# Patient Record
Sex: FEMALE | Race: WHITE | NOT HISPANIC OR LATINO | Employment: UNEMPLOYED | URBAN - METROPOLITAN AREA
[De-identification: names, ages, dates, MRNs, and addresses within clinical notes are randomized per-mention and may not be internally consistent; named-entity substitution may affect disease eponyms.]

---

## 2024-03-27 ENCOUNTER — OFFICE VISIT (OUTPATIENT)
Dept: GYNECOLOGIC ONCOLOGY | Facility: CLINIC | Age: 79
End: 2024-03-27
Payer: MEDICARE

## 2024-03-27 ENCOUNTER — PREP FOR SURGERY (OUTPATIENT)
Dept: OTHER | Facility: HOSPITAL | Age: 79
End: 2024-03-27
Payer: MEDICARE

## 2024-03-27 VITALS
OXYGEN SATURATION: 99 % | BODY MASS INDEX: 33.77 KG/M2 | DIASTOLIC BLOOD PRESSURE: 66 MMHG | WEIGHT: 190.6 LBS | TEMPERATURE: 98.7 F | HEIGHT: 63 IN | SYSTOLIC BLOOD PRESSURE: 143 MMHG | RESPIRATION RATE: 18 BRPM | HEART RATE: 84 BPM

## 2024-03-27 DIAGNOSIS — C54.1 ENDOMETRIAL CANCER: Primary | ICD-10-CM

## 2024-03-27 DIAGNOSIS — N87.9 CERVICAL DYSPLASIA: Primary | ICD-10-CM

## 2024-03-27 PROCEDURE — 1126F AMNT PAIN NOTED NONE PRSNT: CPT | Performed by: OBSTETRICS & GYNECOLOGY

## 2024-03-27 PROCEDURE — 99204 OFFICE O/P NEW MOD 45 MIN: CPT | Performed by: OBSTETRICS & GYNECOLOGY

## 2024-03-27 NOTE — PROGRESS NOTES
"Fallon Pierson  [id number]  1945      Reason for visit:  Newly diagnosed endometrial cancer     Consultation:  Patient is being seen at the request of Dr. Gilman     History of present illness:  The patient is a 78 y.o. year old female who presents today for treatment and evaluation of the above issues.    77 yo, , female present today after new diagnosis of grade 3 EAC. Patient states that PMB  occurs randomly; started approximately 6 months ago. Patient denies any other symptoms including abdominal pain, changes to appetite/weight, N/V, chest pain/SOB, changes to bowel/bladder habits. Patient reports that she has only had 1 Pap smear in her life and that was \"a long time ago\".     TVUS done by Dr. Gilman demonstrated thickened endometrial stripe to 23 mm with vascularity with prominent cervix measuring 6.4 x 5.6 x 5.6 cm . She underwent D&C and pathology demonstrated grade 3 EAC.     She has done well since D&C. She feels well today, continues to have daily vaginal bleeding, but not enough to soak a pad. Of note, she has never had a colonoscopy, it has been >10 years since last mammogram and >10 years since last pap smear.     For new patients, [facility] intake form from  was reviewed and confirmed.    OBGYN History:  She is a .  She does not use HRT. She does have a history of abnormal pap smears, unsure of abnormality.       Oncologic History:  Oncology/Hematology History    No history exists.         History reviewed. No pertinent past medical history.    Past Surgical History:   Procedure Laterality Date    DILATATION AND CURETTAGE         MEDICATIONS:  No current outpatient medications on file.     Allergies:  has No Known Allergies.    Social History:   Social History     Socioeconomic History    Marital status:    Tobacco Use    Smoking status: Never    Smokeless tobacco: Never   Substance and Sexual Activity    Alcohol use: Never    Drug use: Never    Sexual activity: Not Currently " "      Family History:    Family History   Problem Relation Age of Onset    No Known Problems Father     No Known Problems Mother     Breast cancer Sister     No Known Problems Son        Health Maintenance:    Health Maintenance   Topic Date Due    DXA SCAN  Never done    BMI FOLLOWUP  Never done    ZOSTER VACCINE (1 of 2) Never done    RSV Vaccine - Adults (1 - 1-dose 60+ series) Never done    TDAP/TD VACCINES (2 - Tdap) 03/20/2010    Pneumococcal Vaccine 65+ (1 of 1 - PCV) Never done    COVID-19 Vaccine (1 - 2023-24 season) Never done    HEPATITIS C SCREENING  Never done    ANNUAL WELLNESS VISIT  Never done    INFLUENZA VACCINE  08/01/2024         Review of Systems:   All other systems were reviewed and are negative except as mentioned above.    Physical Exam    Vitals:    03/27/24 1300   BP: 143/66   Pulse: 84   Resp: 18   Temp: 98.7 °F (37.1 °C)   TempSrc: Temporal   SpO2: 99%   Weight: 86.5 kg (190 lb 9.6 oz)   Height: 160 cm (63\")   PainSc: 0-No pain       Body mass index is 33.76 kg/m².    Wt Readings from Last 3 Encounters:   03/27/24 86.5 kg (190 lb 9.6 oz)       GENERAL: Alert, well-appearing female appearing her stated age who is in no apparent distress.   HEENT: Sclera anicteric. Head normocephalic, atraumatic. Mucus membranes moist.   NECK: Trachea midline, supple, without masses.  No thyromegaly.   BREASTS: Deferred  CARDIOVASCULAR: Normal rate, regular rhythm, no murmurs, rubs, or gallops.  No peripheral edema.  RESPIRATORY: Clear to auscultation bilaterally, normal respiratory effort  BACK:  No CVA tenderness, no vertebral tenderness on palpation  GASTROINTESTINAL:  Abdomen is soft, non-tender, non-distended, no rebound or guarding, no masses, or hernias. No HSM.    SKIN:  Warm, dry, well-perfused.  All visible areas intact.  No rashes, lesions, ulcers.  PSYCHIATRIC: AO x3, with appropriate affect, normal thought processes.  NEUROLOGIC: No focal deficits.  Moves extremities well.  MUSCULOSKELETAL: " "Normal gait and station.   EXTREMITIES:   No cyanosis, clubbing, symmetric.  LYMPHATICS:  No cervical or inguinal adenopathy noted.     PELVIC exam:    External genitalia are free from lesion. On speculum examination, the cervix appears to be dilated with visible, friable tumor within cervical os. Ectocervix appears normal without lesion. On bimanual examination cervix is enlarged, lower uterine segment is ballooned out.  Uterus enlarged. There is no cervical motion or uterine tenderness. No cervical mass was palpated. Difficult to determine if parametria were smooth due to enlarged cervix/uterus. Rectovaginal exam was deferred.     ECOG PS 0    PROCEDURES:      Diagnostic Data:      XR Chest 2 View    Result Date: 3/18/2024   No acute cardiopulmonary abnormality.  Created by: Kashif Amado MD  Signed by: Kashif Amado MD  Signed on: 3/18/2024 5:31 PM  Location: [id number]            CT Abdomen Pelvis With Contrast    Result Date: 3/7/2024       1.   Malignant cervical and endometrial mass. Primary could be primary cervical neoplasm extending into the endometrium or primary endometrial neoplasm extending into the cervix.      2.   No evidence of abdominal or pelvic metastasis.  3. Cholelithiasis.       Created by: Arturo Davenport MD  Signed by: Arturo Davenport MD  Signed on: 3/7/2024 4:59 PM  Location: [id number]            US Pelvis Complete    Result Date: 3/7/2024       1.   Abnormally thickened endometrial stripe with vascularity. Malignancy is not excluded. Biopsy is recommended.      2.   The uterine cervix also appears prominent. Malignancy cannot be excluded.  3. 3.2 cm uterine fibroid.       Created by: Kaelyn Corona MD  Signed by: Kaelyn Corona MD  Signed on: 3/7/2024 4:52 PM  Location: [id number]              No results found for: \"WBC\", \"HGB\", \"HCT\", \"MCV\", \"PLT\", \"NEUTROABS\", \"GLUCOSE\", \"BUN\", \"CREATININE\", \"EGFRIFNONA\", \"EGFRIFAFRI\", \"NA\", \"K\", \"CL\", \"CO2\", \"MG\", \"PHOS\", \"CALCIUM\", \"ALBUMIN\", \"AST\", \"ALT\", " "\"BILITOT\"  No results found for: \"\"        Assessment & Plan   This is a 78 y.o. woman with newly diagnosed grade xxx EAC.     Grade 3 endometrioid adenocarcinoma  -PMB x 6 months, limited medical care  -s/p ECC with the above pathology   -CT scan report from march 2024 reviewed Primary could be primary cervical neoplasm extending into the endometrium or primary endometrial neoplasm extending into the cervix.      2.   No evidence of abdominal or pelvic metastasis. Will have to get film loaded.  -On exam, the cervix appears to be dilated with visible, friable tumor within cervical os. Ectocervix appears normal without lesion. On bimanual examination cervix is enlarged, lower uterine segment is ballooned out.  Uterus enlarged.   -Dr. Salgado unable to personally view images, but given findings on exam, unable to discern if cervical cancer component is also occurring in addition to endometrial cancer   -Pap smear HPV only collected today to help differentiate between atypical histology cervical primary and stage II endometrial cancer  -Will plan for follow up following pap results to discuss next steps including possible surgical intervention       Encounter Diagnosis   Name Primary?    Cervical dysplasia Yes       Pain assessment was performed today as a part of patient’s care.  For patients with pain related to surgery, gynecologic malignancy or cancer treatment, the plan is as noted in the assessment/plan.  For patients with pain not related to these issues, they are to seek any further needed care from a more appropriate provider, such as PCP.      No orders of the defined types were placed in this encounter.      FOLLOW UP: Follow up following pap results in person vs over video visit to discuss next steps     Yadira Onofre MD   PGY3- OBGYN Resident   [facility]    I spent 45 minutes caring for Penn Laird on this date of service. This time includes time spent by me in the following activities: " preparing for the visit, reviewing tests, performing a medically appropriate examination and/or evaluation, counseling and educating the patient/family/caregiver, ordering medications, tests, or procedures, referring and communicating with other health care professionals, documenting information in the medical record, and care coordination      Patient was seen and examined with Dr. Maciel,  resident, who performed portions of the examination and documentation for this patient's care under my direct supervision.  I agree with the above documentation and plan.    Tanya Salgado MD  04/03/24  09:29 EDT

## 2024-03-29 LAB — REF LAB TEST METHOD: NORMAL

## 2024-04-04 ENCOUNTER — TELEPHONE (OUTPATIENT)
Dept: GYNECOLOGIC ONCOLOGY | Facility: CLINIC | Age: 79
End: 2024-04-04
Payer: MEDICARE

## 2024-04-04 NOTE — TELEPHONE ENCOUNTER
RN called patient and discussed that there was not a biopsy but a pap was done. Patient stated that she saw the results on the pap were negative. RN confirmed that this was correct per her chart but encouraged patient to keep the f/u visit with Dr. Salgado that is scheduled for next Wednesday, 4/10. Patient verified time and date and verbalized intention to come to f/u.

## 2024-04-10 ENCOUNTER — TELEMEDICINE (OUTPATIENT)
Dept: GYNECOLOGIC ONCOLOGY | Facility: CLINIC | Age: 79
End: 2024-04-10
Payer: MEDICARE

## 2024-04-10 DIAGNOSIS — C54.1 ENDOMETRIAL CANCER: Primary | ICD-10-CM

## 2024-04-10 PROCEDURE — 1126F AMNT PAIN NOTED NONE PRSNT: CPT | Performed by: OBSTETRICS & GYNECOLOGY

## 2024-04-10 PROCEDURE — 1159F MED LIST DOCD IN RCRD: CPT | Performed by: OBSTETRICS & GYNECOLOGY

## 2024-04-10 PROCEDURE — 99212 OFFICE O/P EST SF 10 MIN: CPT | Performed by: OBSTETRICS & GYNECOLOGY

## 2024-04-10 PROCEDURE — 1160F RVW MEDS BY RX/DR IN RCRD: CPT | Performed by: OBSTETRICS & GYNECOLOGY

## 2024-04-10 NOTE — PROGRESS NOTES
Sorry for miscommunication - will consider RT as a part of patients care after neoadjuvant treatment followed by surgery depending on response to treatment.

## 2024-04-10 NOTE — LETTER
"Fallon Pierson  [id number]  1945      Reason for visit:  Grade 3 endometrial cancer     Consultation:  Patient initially seen at the request of Dr. Gilman     History of present illness:  The patient is a 78 y.o. year old female who presents today for treatment and evaluation of the above issues.    From previous visit 3/27/2024   \"79 yo, , female present today after new diagnosis of grade 3 EAC. Patient states that PMB  occurs randomly; started approximately 6 months ago. Patient denies any other symptoms including abdominal pain, changes to appetite/weight, N/V, chest pain/SOB, changes to bowel/bladder habits. Patient reports that she has only had 1 Pap smear in her life and that was \"a long time ago\".   TVUS done by Dr. Gilman demonstrated thickened endometrial stripe to 23 mm with vascularity with prominent cervix measuring 6.4 x 5.6 x 5.6 cm . She underwent D&C and pathology demonstrated grade 3 EAC on ECC specimen.  She has done well since D&C. She feels well today, continues to have daily vaginal bleeding, but not enough to soak a pad. Of note, she has never had a colonoscopy, it has been >10 years since last mammogram and >10 years since last pap smear.\"  Since patient's initial visit, have been able to visualize the CT imaging which shows a suspicious periotic lymph node measuring just shy of 10 mm.  I pointed this out to the patient and her family while on video visit.  I pointed out the medically enlarged cervix we discussed that challenges from a surgical standpoint.    OBGYN History:  She is a .  She does not use HRT. She does have a history of abnormal pap smears, unsure of abnormality.     Oncologic History:  Oncology/Hematology History   Endometrial cancer   2024 Initial Diagnosis    Endometrial cancer     4/10/2024 Cancer Staged    Staging form: Corpus Uteri - Carcinoma And Carcinosarcoma, AJCC 8th Edition  - Clinical stage from 4/10/2024: FIGO Stage IIIC2 (cT2, cN2, cM0) - " Signed by Tanya Salgado MD on 4/10/2024     4/24/2024 -  Chemotherapy    OP ENDOMETRIAL Dostarlimab-gxly / CARBOplatin AUC=5 / PACLitaxel           History reviewed. No pertinent past medical history.    Past Surgical History:   Procedure Laterality Date    DILATATION AND CURETTAGE         MEDICATIONS:  No current outpatient medications on file.     Allergies:  has No Known Allergies.    Social History:   Social History     Socioeconomic History    Marital status:    Tobacco Use    Smoking status: Never    Smokeless tobacco: Never   Substance and Sexual Activity    Alcohol use: Never    Drug use: Never    Sexual activity: Not Currently       Family History:    Family History   Problem Relation Age of Onset    No Known Problems Father     No Known Problems Mother     Breast cancer Sister     No Known Problems Son        Health Maintenance:    Health Maintenance   Topic Date Due    DXA SCAN  Never done    BMI FOLLOWUP  Never done    ZOSTER VACCINE (1 of 2) Never done    RSV Vaccine - Adults (1 - 1-dose 60+ series) Never done    TDAP/TD VACCINES (2 - Tdap) 03/20/2010    Pneumococcal Vaccine 65+ (1 of 1 - PCV) Never done    COVID-19 Vaccine (1 - 2023-24 season) Never done    HEPATITIS C SCREENING  Never done    ANNUAL WELLNESS VISIT  Never done    INFLUENZA VACCINE  08/01/2024         Review of Systems:   All other systems were reviewed and are negative except as mentioned above.    Physical Exam    There were no vitals filed for this visit.      There is no height or weight on file to calculate BMI.    Wt Readings from Last 3 Encounters:   03/27/24 86.5 kg (190 lb 9.6 oz)       GENERAL: Alert, well-appearing female appearing her stated age who is in no apparent distress.   HEENT: Sclera anicteric. Head normocephalic, atraumatic. Mucus membranes moist.   NECK: Trachea midline, supple, without masses.  No thyromegaly.   BREASTS: Deferred  Remainder of examination deferred due to video visit    Diagnostic  "Data:      XR Chest 2 View    Result Date: 3/18/2024   No acute cardiopulmonary abnormality.  Created by: Kashif Amado MD  Signed by: Kashif Amado MD  Signed on: 3/18/2024 5:31 PM  Location: [id number]            CT Abdomen Pelvis With Contrast    Result Date: 3/7/2024       1.   Malignant cervical and endometrial mass. Primary could be primary cervical neoplasm extending into the endometrium or primary endometrial neoplasm extending into the cervix.      2.   No evidence of abdominal or pelvic metastasis.  3. Cholelithiasis.       Created by: Arturo Davenport MD  Signed by: Arturo Davenport MD  Signed on: 3/7/2024 4:59 PM  Location: [id number]            US Pelvis Complete    Result Date: 3/7/2024       1.   Abnormally thickened endometrial stripe with vascularity. Malignancy is not excluded. Biopsy is recommended.      2.   The uterine cervix also appears prominent. Malignancy cannot be excluded.  3. 3.2 cm uterine fibroid.       Created by: Kaelyn Corona MD  Signed by: Kaelyn Corona MD  Signed on: 3/7/2024 4:52 PM  Location: [id number]              No results found for: \"WBC\", \"HGB\", \"HCT\", \"MCV\", \"PLT\", \"NEUTROABS\", \"GLUCOSE\", \"BUN\", \"CREATININE\", \"EGFRIFNONA\", \"EGFRIFAFRI\", \"NA\", \"K\", \"CL\", \"CO2\", \"MG\", \"PHOS\", \"CALCIUM\", \"ALBUMIN\", \"AST\", \"ALT\", \"BILITOT\"  No results found for: \"\"        Assessment & Plan   This is a 78 y.o. woman with newly diagnosed grade endometrioid adenocarcinoma    Grade 3 endometrioid adenocarcinoma with extensive cervical involvement, HPV negative Pap smear  Concern for periotic lymph node involvement with marginally enlarged aortic lymph node noted on CT imaging.  All options reviewed; this included:  palliative radiation only versus surgery vs neoadjuvant chemotherapy/immunotherapy with interval debulking, subsequent chemotherapy /immunotherapy + RT with consideration of maintenance immunotherapy.  Patient and family were advised that surgery alone would be a radical hysterectomy and quite " extensive with potential long-term bladder dysfunction and most certainly she would need adjuvant treatment of some sort. Patient and family were extensively counseled regarding the options.  At this point, patient would like to proceed with neoadjuvant chemotherapy and immunotherapy.  We reviewed the inherent side effects of Carboplatin/Paclitaxel chemotherapy, including but not limited to: bone marrow suppression, peripheral neuropathy, fatigue, alopecia, constipation, and less commonly nausea/vomiting, allergic reaction, extravasation.   We discussed common toxicities with immunotherapy including, but not limited to, thyroid dysfunction, diarrhea, liver dysfunction, rash and pruritus.  We discussed differences in treatment of toxicities between chemotherapy and immunotherapy.  Patient was offered treatment closer to home, but would prefer to keep all of her care with this office for now.  -needs chest imaging for completeness sake, will try to schedule with her chemotherapy education and consent    Encounter Diagnosis   Name Primary?    Endometrial cancer Yes     Pain assessment was performed today as a part of patient’s care.  For patients with pain related to surgery, gynecologic malignancy or cancer treatment, the plan is as noted in the assessment/plan.  For patients with pain not related to these issues, they are to seek any further needed care from a more appropriate provider, such as PCP.      Orders Placed This Encounter   Procedures    CT Chest With Contrast     Standing Status:   Future     Standing Expiration Date:   4/10/2025     Order Specific Question:   Release to patient     Answer:   Routine Release [[id number]]     FOLLOW UP: Chemotherapy teaching    This was an audio and video enabled telemedicine encounter.  Length of encounter:  18 minutes    Tanya Salgado MD  04/10/24  09:29 EDT

## 2024-04-11 ENCOUNTER — TELEPHONE (OUTPATIENT)
Dept: GYNECOLOGIC ONCOLOGY | Facility: CLINIC | Age: 79
End: 2024-04-11
Payer: MEDICARE

## 2024-04-11 NOTE — TELEPHONE ENCOUNTER
Caller: JESSICA REED    Relationship to patient: Emergency Contact    Best call back number: [phone]    Chief complaint: R/S    Type of visit: LAB, F/U AND INFUSION    Requested date: ANY OTHER DAY JUST CAN'T DO APPOINTMENTS ON FRIDAYS     If rescheduling, when is the original appointment: 5-

## 2024-04-11 NOTE — TELEPHONE ENCOUNTER
----- Message from Leanne Crook RN sent at 4/10/2024  1:31 PM EDT -----  Regarding: new treatment : Naomi Salgado would like to start ALISAI,  49, on Carbo/paclitaxel/doxtarlimab x 3 then surgery, likely adjuvant chemo/RT after surgery, to start ASAP.  Thanks,  Leanne GALEANO

## 2024-04-11 NOTE — TELEPHONE ENCOUNTER
Caller: JESSICA REED    Relationship to patient: D-I-L    Best call back number: [phone]    Chief complaint: JESSICA IS INQUIRING IF THE CT SCAN SCHEDULED FOR 4/16/24 CAN BE MOVED UP TIME WISE CLOSER TO PT'S CHEMO EDUCATION, AS THEY DRIVE A 100 MILES & HOPE NOT TO STAY TOO LATE.  PLEASE ADVISE.    Type of visit: CT SCAN    Requested date: SAME DATY - EARLIER TIME     If rescheduling, when is the original appointment: 4/17/

## 2024-04-11 NOTE — TELEPHONE ENCOUNTER
Called and spoke with TOM Heredia. All appointments dates and times given. They are unable to do Friday appointments, so cycle #2 was moved back 1 day.

## 2024-04-11 NOTE — TELEPHONE ENCOUNTER
Spoke with Giovanna. Advised that we wanted to start patient as soon as possible and we had to have her worked in for all appointments on 4/16/24. Giovanna askew/jayesh

## 2024-04-16 ENCOUNTER — HOSPITAL ENCOUNTER (OUTPATIENT)
Dept: ONCOLOGY | Facility: HOSPITAL | Age: 79
Discharge: HOME | End: 2024-04-16
Payer: MEDICARE

## 2024-04-16 ENCOUNTER — HOSPITAL ENCOUNTER (OUTPATIENT)
Dept: CT IMAGING | Facility: HOSPITAL | Age: 79
Discharge: HOME | End: 2024-04-16
Payer: MEDICARE

## 2024-04-16 ENCOUNTER — SPECIALTY PHARMACY (OUTPATIENT)
Dept: ONCOLOGY | Facility: HOSPITAL | Age: 79
End: 2024-04-16
Payer: MEDICARE

## 2024-04-16 ENCOUNTER — OFFICE VISIT (OUTPATIENT)
Dept: GYNECOLOGIC ONCOLOGY | Facility: CLINIC | Age: 79
End: 2024-04-16
Payer: MEDICARE

## 2024-04-16 VITALS
HEART RATE: 92 BPM | RESPIRATION RATE: 18 BRPM | OXYGEN SATURATION: 97 % | WEIGHT: 183.7 LBS | TEMPERATURE: 97.5 F | DIASTOLIC BLOOD PRESSURE: 92 MMHG | HEIGHT: 63 IN | SYSTOLIC BLOOD PRESSURE: 133 MMHG | BODY MASS INDEX: 32.55 KG/M2

## 2024-04-16 DIAGNOSIS — C54.1 ENDOMETRIAL CANCER: ICD-10-CM

## 2024-04-16 DIAGNOSIS — T45.1X5A ALOPECIA DUE TO CYTOTOXIC DRUG: ICD-10-CM

## 2024-04-16 DIAGNOSIS — C54.1 ENDOMETRIAL CANCER: Primary | ICD-10-CM

## 2024-04-16 DIAGNOSIS — L65.8 ALOPECIA DUE TO CYTOTOXIC DRUG: ICD-10-CM

## 2024-04-16 LAB
ALBUMIN SERPL-MCNC: 3.8 G/DL (ref 3.5–5.2)
ALBUMIN/GLOB SERPL: 1.3 G/DL
ALP SERPL-CCNC: 76 U/L (ref 39–117)
ALT SERPL W P-5'-P-CCNC: 13 U/L (ref 1–33)
ANION GAP SERPL CALCULATED.3IONS-SCNC: 9 MMOL/L (ref 5–15)
AST SERPL-CCNC: 19 U/L (ref 1–32)
BASOPHILS # BLD AUTO: 0.02 10*3/MM3 (ref 0–0.2)
BASOPHILS NFR BLD AUTO: 0.2 % (ref 0–1.5)
BILIRUB SERPL-MCNC: 0.3 MG/DL (ref 0–1.2)
BUN SERPL-MCNC: 8 MG/DL (ref 8–23)
BUN/CREAT SERPL: 12.3 (ref 7–25)
CALCIUM SPEC-SCNC: 9.3 MG/DL (ref 8.6–10.5)
CANCER AG125 SERPL QL: 42.8 U/ML (ref 0–38.1)
CHLORIDE SERPL-SCNC: 104 MMOL/L (ref 98–107)
CO2 SERPL-SCNC: 27 MMOL/L (ref 22–29)
CREAT SERPL-MCNC: 0.65 MG/DL (ref 0.57–1)
DEPRECATED RDW RBC AUTO: 46.8 FL (ref 37–54)
EGFRCR SERPLBLD CKD-EPI 2021: 91.4 ML/MIN/1.73
EOSINOPHIL # BLD AUTO: 0.14 10*3/MM3 (ref 0–0.4)
EOSINOPHIL NFR BLD AUTO: 1.1 % (ref 0.3–6.2)
ERYTHROCYTE [DISTWIDTH] IN BLOOD BY AUTOMATED COUNT: 13 % (ref 12.3–15.4)
GLOBULIN UR ELPH-MCNC: 3 GM/DL
GLUCOSE SERPL-MCNC: 140 MG/DL (ref 65–99)
HCT VFR BLD AUTO: 39.8 % (ref 34–46.6)
HGB BLD-MCNC: 12.8 G/DL (ref 12–15.9)
IMM GRANULOCYTES # BLD AUTO: 0.03 10*3/MM3 (ref 0–0.05)
IMM GRANULOCYTES NFR BLD AUTO: 0.2 % (ref 0–0.5)
LYMPHOCYTES # BLD AUTO: 2.88 10*3/MM3 (ref 0.7–3.1)
LYMPHOCYTES NFR BLD AUTO: 21.7 % (ref 19.6–45.3)
MCH RBC QN AUTO: 31.1 PG (ref 26.6–33)
MCHC RBC AUTO-ENTMCNC: 32.2 G/DL (ref 31.5–35.7)
MCV RBC AUTO: 96.6 FL (ref 79–97)
MONOCYTES # BLD AUTO: 0.75 10*3/MM3 (ref 0.1–0.9)
MONOCYTES NFR BLD AUTO: 5.7 % (ref 5–12)
NEUTROPHILS NFR BLD AUTO: 71.1 % (ref 42.7–76)
NEUTROPHILS NFR BLD AUTO: 9.45 10*3/MM3 (ref 1.7–7)
PLATELET # BLD AUTO: 318 10*3/MM3 (ref 140–450)
PMV BLD AUTO: 9.8 FL (ref 6–12)
POTASSIUM SERPL-SCNC: 4.6 MMOL/L (ref 3.5–5.2)
PROT SERPL-MCNC: 6.8 G/DL (ref 6–8.5)
RBC # BLD AUTO: 4.12 10*6/MM3 (ref 3.77–5.28)
SODIUM SERPL-SCNC: 140 MMOL/L (ref 136–145)
T4 FREE SERPL-MCNC: 1.36 NG/DL (ref 0.92–1.68)
TSH SERPL DL<=0.05 MIU/L-ACNC: 0.89 UIU/ML (ref 0.27–4.2)
WBC NRBC COR # BLD AUTO: 13.27 10*3/MM3 (ref 3.4–10.8)

## 2024-04-16 PROCEDURE — 84443 ASSAY THYROID STIM HORMONE: CPT | Performed by: NURSE PRACTITIONER

## 2024-04-16 PROCEDURE — 85025 COMPLETE CBC W/AUTO DIFF WBC: CPT | Performed by: NURSE PRACTITIONER

## 2024-04-16 PROCEDURE — 80053 COMPREHEN METABOLIC PANEL: CPT | Performed by: NURSE PRACTITIONER

## 2024-04-16 PROCEDURE — 25510000001 IOPAMIDOL PER 1 ML: Performed by: OBSTETRICS & GYNECOLOGY

## 2024-04-16 PROCEDURE — 84439 ASSAY OF FREE THYROXINE: CPT | Performed by: NURSE PRACTITIONER

## 2024-04-16 PROCEDURE — 36415 COLL VENOUS BLD VENIPUNCTURE: CPT

## 2024-04-16 PROCEDURE — 71260 CT THORAX DX C+: CPT

## 2024-04-16 PROCEDURE — 86304 IMMUNOASSAY TUMOR CA 125: CPT | Performed by: OBSTETRICS & GYNECOLOGY

## 2024-04-16 RX ORDER — ONDANSETRON HYDROCHLORIDE 8 MG/1
8 TABLET, FILM COATED ORAL 3 TIMES DAILY PRN
Qty: 30 TABLET | Refills: 5 | Status: SHIPPED | OUTPATIENT
Start: 2024-04-16

## 2024-04-16 RX ORDER — OLANZAPINE 5 MG/1
5 TABLET ORAL NIGHTLY
Qty: 4 TABLET | Refills: 5 | Status: SHIPPED | OUTPATIENT
Start: 2024-04-16

## 2024-04-16 RX ADMIN — IOPAMIDOL 71 ML: 755 INJECTION, SOLUTION INTRAVENOUS at 15:02

## 2024-04-16 NOTE — PROGRESS NOTES
"CHEMOTHERAPY PREPARATION    Fallon Pierson  [id number]  1945    Subjective   Chief Complaint: Treatment Preparation and Needs Assessment    History of present illness:  Fallon Pierson is a 78 y.o. year old female who is here today for chemotherapy preparation and needs assessment. She is accompanied by several nel ladies including her daughter in law and nieces. She has a very good family support system. They live out of town and have traveled 100 miles to [facility] today. The patient has been diagnosed with grade 3 endometrial cancer and is scheduled to begin neoadjuvant treatment with chemotherapy and immunotherapy later this week. Current plan: carboplatin, paclitaxel, and doxtarlimab x 3, then interval debulking, and likely adjuvant chemo/RT after surgery, to start ASAP.  There is notable concern for periaortic lymph node involvement with marginally enlarged aortic lymph node noted on CT imaging.  She is scheduled for a CT chest with contrast later today at 4pm. She has no complaints at this time.       Oncology History:    Oncology/Hematology History   Endometrial cancer   4/9/2024 Initial Diagnosis    Endometrial cancer     4/10/2024 Cancer Staged    Staging form: Corpus Uteri - Carcinoma And Carcinosarcoma, AJCC 8th Edition  - Clinical stage from 4/10/2024: FIGO Stage IIIC2 (cT2, cN2, cM0) - Signed by Tanya Salgado MD on 4/10/2024     4/18/2024 -  Chemotherapy    OP ENDOMETRIAL Dostarlimab-gxly / CARBOplatin AUC=5 / PACLitaxel         The current medication list and allergy list were reviewed and reconciled.     Past Medical History, Past Surgical History, Social History, Family History have been reviewed and are without significant changes except as mentioned.    Review of Systems    Objective   Physical Exam  Vital Signs: /92   Pulse 92   Temp 97.5 °F (36.4 °C) (Temporal)   Resp 18   Ht 160 cm (62.99\")   Wt 83.3 kg (183 lb 11.2 oz)   SpO2 97%   BMI 32.55 kg/m²   Vitals:    04/16/24 1131 "   PainSc: 0-No pain           General Appearance:  alert, cooperative, no apparent distress and appears stated age   Neurologic/Psychiatric: A&O x 3, gait steady, appropriate affect   HEENT:  Normocephalic, without obvious abnormality, mucous membranes moist   Lungs:   Clear to auscultation bilaterally; respirations regular, even, and unlabored bilaterally   Heart:  Regular rate and rhythm, no murmurs appreciated   Extremities: Normal, atraumatic; no clubbing, cyanosis, or edema    Skin: No rashes, lesions, or abnormal coloration noted     ECOG Performance Status: 0 - Asymptomatic            NEEDS ASSESSMENTS    Genetics  The patient's new diagnosis and family history have been reviewed for genetic counseling needs. A genetic referral is not recommended.     Molecular Testing  I will follow up if further molecular testing on tumor is indicated at this time.     Psychosocial  The patient has completed a PHQ-9 Depression Screening and the Distress Thermometer (DT) today.   PHQ-9 Total Score:  . PHQ-9 results show 0 (No Depression). The patient scored their distress today as 0 on a scale of 0-10 with 0 being no distress and 10 being extreme distress.   Problems marked by the patient as being an issue for them within the last week include no problems.   Results were reviewed along with psychosocial resources offered by our cancer center. Our oncology social worker will be flagged for a DT score of 4 or above, and a same day call will be made for a score of 9 or 10. A mental health referral is not recommended at this time. The patient is not accepting of a referral to [facility].   Copies of patient's questionnaires will be scanned into EMR for details and further reference.    Barriers to care  A barriers form was also completed by the patient today. We discussed services offered by our facility to help her have adequate access to care. The patient was given the name and card for our Oncology Social Worker, Jes  "Audrey. Based upon barriers assessment today, the patient will not require a follow-up call from the  to further discuss needs.   A copy of the barriers form will also be scanned into EMR for details and further reference.     VAD Assessment  The patient and I discussed planned intervenous chemotherapy as well as other IV treatments that are often needed throughout the course of treatment. These may include, but are not limited to blood transfusions, antibiotics, and IV hydration. The vasculature does appear to be adequate for multiple peripheral IVs throughout their treatment course. Discussed risks and benefits of VADs. The patient would not like to pursue Port-A-Cath insertion prior to initiation of treatment.     Advance Care Planning   ACP discussion was held with the patient during this visit. Patient does not have an advance directive, information provided.  The patient and I discussed advanced care planning, \"Conversations that Matter\".   This service was offered, free of charge, for development of advance directives with a certified ACP facilitator.  The patient does not have an up-to-date advanced directive. This document is not on file with our office. The patient is not interested in an appointment with one of our facilitators to create or update their advanced directives.         Palliative Care  The patient and I discussed palliative care services. Palliative care is not the same as Hospice care. This is specialized medical care for people living with serious illness with the goal of improving quality of life for the patient and their family. [facility] has partnered with [facility] to offer our patients outpatient palliative care early along with their treatment to assist in coordination of care, symptom management, pain management, and medical decision making.  Oncology criteria for palliative care referral is not met at this time. The patient is not interested in a " palliative care consultation.     Additional Referral needs  none      CHEMOTHERAPY EDUCATION    Booklets Given: Chemotherapy and You [x]  Eating Hints [x]    Sexuality/Fertility Books []      Chemotherapy/Biotherapy Education Sheets: (list all that apply)  nausea management, acid reflux management, diarrhea management, Cancer resourse contacts information, skin and mouth care, vaccination information, wig information, and Treatment Calendar                                                                                                                                                                 Chemotherapy Regimen:   Treatment Plans       Name Type Plan Dates Plan Provider         Active    OP ENDOMETRIAL Dostarlimab-gxly / CARBOplatin AUC=5 / PACLitaxel ONCOLOGY TREATMENT  4/23/2024 - Present Tanya Salgado MD                          Assessment and Plan:    Diagnoses and all orders for this visit:    -CT chest later today, as scheduled   -At this time, she would like to proceed with treatment using peripheral IV access.  If the first 3 cycles prove difficult, she may reconsider Port-A-Cath placement at time of her surgery.    1. Endometrial cancer (Primary)  -     CBC and Differential; Future  -     Comprehensive metabolic panel; Future  -     TSH; Future  -     T4, free; Future  -     Lab Appointment Request; Future  -     Clinic Appointment Request; Future  -     Infusion Appointment Request 3; Future  -     OLANZapine (zyPREXA) 5 MG tablet; Take 1 tablet by mouth Every Night. Take nightly x 4 starting night of chemotherapy.  Dispense: 4 tablet; Refill: 5  -     ondansetron (ZOFRAN) 8 MG tablet; Take 1 tablet by mouth 3 (Three) Times a Day As Needed for Nausea or Vomiting.  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 This time includes time spent by me in  the following activities: preparing for the visit, reviewing tests, obtaining and/or reviewing a separately obtained history, performing a medically appropriate examination and/or evaluation, counseling and educating the patient/family/caregiver, ordering medications, tests, or procedures, referring and communicating with other health care professionals, documenting information in the medical record, independently interpreting results and communicating that information with the patient/family/caregiver, and care coordination.     The patient and I have reviewed their new cancer diagnosis and scheduled treatment plan. Needs assessment was completed including genetics, psychosocial needs, barriers to care, VAD evaluation, advanced care planning, and palliative care services. Referrals have been ordered as appropriate based upon our evaluation and patient desires.     Chemotherapy teaching was also completed today as documented above. Adequate time was given to answer all questions to her satisfaction. Patient and family are aware of their care team members and contact information if they have questions or problems throughout the treatment course. Needs assessments and education has been completed. The patient is adequately prepared to begin treatment as scheduled.     Pain assessment was performed today as a part of patient’s care. For patients with pain related to surgery, gynecologic malignancy or cancer treatment, the plan is as noted in the assessment/plan.  For patients with pain not related to these issues, they are to seek any further needed care from a more appropriate provider, such as PCP.      Electronically signed by [facility] Cano on 04/16/24 at 11:43 EDT.

## 2024-04-16 NOTE — PROGRESS NOTES
Outpatient Infusion  [address] [id number]  [phone]      CHEMOTHERAPY EDUCATION    NAME:  Fallon Pierson      : 1945           DATE: 24    Medication Education Sheets: (select all that apply)  Dostarlimab, Carboplatin, and Paclitaxel    Other Education Sheets: (select all that apply)  CINV, Diarrhea, HOPA Immune Checkpoint Inhibitors, Checkpoint Inhibitor Wallet Card, and Symptom Tracker Sheet and UYEN Information    Chemotherapy Regimen:   OP ENDOMETRIAL Dostarlimab-gxly / CARBOplatin AUC=5 / PACLitaxel every 21 days x 3 cycles      TOPICS EDUCATION PROVIDED COMMENTS   ANEMIA:  role of RBC, cause, s/s, ways to manage, role of transfusion [x] Reviewed the role of RBC and the use of transfusions if hemoglobin decreases too much.  Patient to notify us if she experiences shortness of breath, dizziness, or palpitations.  Also let patient know she could feel more tired than usual and to try to stay active, but rest if she needs to.    THROMBOCYTOPENIA:  role of platelet, cause, s/s, ways to prevent bleeding, things to avoid, when to seek help [x] Reviewed the role of platelets in blood clotting and when to call clinic (bloody nose that bleeds for 5 minutes despite pressure, a cut that won't stop bleeding despite pressure, gums that bleed excessively with brushing or flossing). Recommended using an electric razor, soft bristle toothbrush, and blowing the nose gently.    NEUTROPENIA:  role of WBC, cause, infection precautions, s/s of infection, when to call MD [x] Reviewed the role of WBC, good infection prevention practices, and when to call the clinic (temperature 100.4F, sore throat, burning urination, etc)   NUTRITION & APPETITE CHANGES:  importance of maintaining healthy diet & weight, ways to manage to improve intake, dietary consult, exercise regimen, electrolyte and/or blood glucose abnormalities [x] Decreased Appetite: Discussed the risk of decreased  appetite. Recommended eating smaller, more frequent meals. Instructed the patient to contact the clinic if losing weight or having difficulty eating enough to maintain energy level., Metallic Taste: Informed patient of the potential for developing metallic taste and smell. Recommended flavoring water if it tastes metallic, using plastic utensils instead of metal utensils, and avoiding drinking from a metal can or cup to help mitigate this adverse effect.   DIARRHEA:  causes, s/s of dehydration, ways to manage, dietary changes, when to call MD [x] Both: Discussed the risk of diarrhea and immune related colitis. Can use OTC loperamide with diarrhea onset, but call the clinic if 4-6 episodes in 24 hours not relieved by OTC loperamide. Discussed the role of high-dose steroids for the treatment of immune related colitis.    CONSTIPATION:  causes, ways to manage, dietary changes, when to call MD [x] Provided supplementary handout with instructions for use of docusate and other OTC therapies to manage constipation.  Instructed patient to call us if medications aren't working.    NAUSEA & VOMITING:  cause, use of antiemetics, dietary changes, when to call MD [x] Emetic risk: High  Premeds: Dexamethasone, Fosaprepitant, and Palonosetron  Scheduled home meds: Olanzapine 5 mg by mouth at night on days 1-4 after chemotherapy to prevent delayed nausea  PRN home meds: Ondansetron  Pharmacy home meds sent to: [facility] will send in after needs assessment.    Instructed the patient to take the scheduled anti-nausea medications even if she does not feel nauseous.  I explained this is to prevent delayed nausea.    Instructed the patient to take a dose of the PRN medication at the first onset of nausea and if it's not working to call us for additional medications.  Also provided non-drug measures to mitigate nausea.   MOUTH SORES:  causes, oral care, ways to manage [x] Mouth sores can be prevented by making a mouth wash  mixture of salt, baking soda, and water. The patient was instructed to swish and spit four times daily after meals and before bedtime. Also recommended using a soft bristle toothbrush and avoiding alcohol-containing OTC mouthwashes.    ALOPECIA:  cause, ways to manage, resources [x] Discussed the possibility of hair loss with the patient. Informed patient that she could request a prescription for a wig if desired and most of the cost is usually covered by insurance. Recommended covering the head with a hat and/or protecting the skin on the head with SPF 30 or higher.    NERVOUS SYSTEM CHANGES:  causes, s/s, neuropathies, cognitive changes, ways to manage [x] discussed the adverse effect of peripheral neuropathy and signs/symptoms associated with this adverse effect. Instructed patient to call if symptoms become more frequent or worsen.    PAIN:  causes, ways to manage [x] Chemo: Discussed muscle and joint aches/pains with chemotherapy, and recommended the use of OTC pain relief with ibuprofen or acetaminophen if needed.   SKIN & NAIL CHANGES:  cause, s/s, ways to manage [x] Both: Discussed the potential for a rash or itchy skin from immunotherapy, offered nonpharmacologic prevention strategies, and instructed the patient to call if a rash develops and worsens. Informed the patient of the possibility for dark or white lines on finger and toenails during treatment, and that nails may become brittle and even fall off in extreme cases. This will likely reverse after treatment concludes.    ORGAN TOXICITIES:  cause, s/s, need for diagnostic tests, labs, when to notify MD [x] Discussed potential effects on organ systems, monitoring, diagnostic tests, labs, and when to notify the clinic. Discussed the signs/symptoms of the following: cardiotoxicity, central neurotoxicity (confusion, vision changes, stiff neck, seizure), eye changes (such as blurry vision, watery eyes, photophobia), hepatotoxicity, hormone gland changes  (most commonly the thyroid), lung changes, nephrotoxicity, ototoxicity (loss of ability to hear high-pitched sounds), and skin changes.   INFUSION RELATED REACTIONS or INJECTION-SITE REACTION:  Cause, s/s, anaphylaxis, monitoring, etc. [x] Premeds: Diphenhydramine and Famotidine      Discussed the risk of an infusion reaction and symptoms such as: fever, chills, dizziness, itchiness or rash, flushing, trouble breathing, wheezing, sudden back pain, or feeling faint.  Instructed the patient to notify her nurse if she starts feeling weird at any point during her infusion.      Reviewed how infusion reactions are managed.   SURVIVORSHIP:  distress, distress assessment, secondary malignancies, early/late effects, follow-up, social issues, social support []    MISCELLANEOUS:  drug interactions, administration, labs, etc. [x] Discussed chemotherapy schedule, lab draws, infusion times, and total expected visit time.   DDIs: No significant DDIs  Lab draws: On or before day 1 of each cycle, no sooner than 3 days early.   INFERTILITY & SEXUALITY:    causes, fertility preservation options, sexuality changes, ways to manage, importance of birth control [x] IV Oncology Therapy: Reviewed safe sex practices and the importance of minimizing exposure to body fluids for 48 hours after each dose of IV oncology therapy., The patient is not of childbearing potential.   HOME CARE:  storing of oral chemo, how to manage bodily fluids [x] IV - Counseled on management of soiled linens and proper flush technique.  Discussed how to manage all the side effects at home and advised when to contact the MD office     Medications:  Prior to Admission medications    Not on File       Notes: All questions and concerns were addressed. Provided a personalized treatment calendar to patient (includes treatment and lab schedule). Provided patient with contact information for the pharmacist and clinic while instructing them to call if any questions or  concerns arise. Informed consent for treatment was obtained. Patient was receptive to information and expressed understanding.     Jackie Ta PharmD, [facility]  Clinical Oncology Pharmacy Specialist  Phone: [phone]      4/16/2024  10:31 EDT

## 2024-04-18 ENCOUNTER — HOSPITAL ENCOUNTER (OUTPATIENT)
Dept: ONCOLOGY | Facility: HOSPITAL | Age: 79
Discharge: HOME | End: 2024-04-18
Payer: MEDICARE

## 2024-04-18 ENCOUNTER — DOCUMENTATION (OUTPATIENT)
Dept: NUTRITION | Facility: HOSPITAL | Age: 79
End: 2024-04-18
Payer: MEDICARE

## 2024-04-18 ENCOUNTER — TELEPHONE (OUTPATIENT)
Dept: GYNECOLOGIC ONCOLOGY | Facility: CLINIC | Age: 79
End: 2024-04-18
Payer: MEDICARE

## 2024-04-18 VITALS
HEIGHT: 63 IN | TEMPERATURE: 97.9 F | HEART RATE: 81 BPM | SYSTOLIC BLOOD PRESSURE: 136 MMHG | WEIGHT: 185.3 LBS | BODY MASS INDEX: 32.83 KG/M2 | DIASTOLIC BLOOD PRESSURE: 77 MMHG | RESPIRATION RATE: 18 BRPM

## 2024-04-18 DIAGNOSIS — C54.1 ENDOMETRIAL CANCER: Primary | ICD-10-CM

## 2024-04-18 PROCEDURE — 25810000003 SODIUM CHLORIDE 0.9 % SOLUTION: Performed by: INTERNAL MEDICINE

## 2024-04-18 PROCEDURE — 25010000002 FOSAPREPITANT PER 1 MG: Performed by: INTERNAL MEDICINE

## 2024-04-18 PROCEDURE — 96415 CHEMO IV INFUSION ADDL HR: CPT

## 2024-04-18 PROCEDURE — 25010000002 PALONOSETRON PER 25 MCG: Performed by: INTERNAL MEDICINE

## 2024-04-18 PROCEDURE — 25010000002 DOSTARLIMAB-GXLY 500 MG/10ML SOLUTION 10 ML VIAL: Performed by: INTERNAL MEDICINE

## 2024-04-18 PROCEDURE — 96413 CHEMO IV INFUSION 1 HR: CPT

## 2024-04-18 PROCEDURE — 96417 CHEMO IV INFUS EACH ADDL SEQ: CPT

## 2024-04-18 PROCEDURE — 25810000003 SODIUM CHLORIDE 0.9 % SOLUTION 250 ML FLEX CONT: Performed by: INTERNAL MEDICINE

## 2024-04-18 PROCEDURE — 25010000002 DEXAMETHASONE SODIUM PHOSPHATE 100 MG/10ML SOLUTION: Performed by: INTERNAL MEDICINE

## 2024-04-18 PROCEDURE — 96367 TX/PROPH/DG ADDL SEQ IV INF: CPT

## 2024-04-18 PROCEDURE — 25810000003 SODIUM CHLORIDE 0.9 % SOLUTION 500 ML FLEX CONT: Performed by: INTERNAL MEDICINE

## 2024-04-18 PROCEDURE — 25010000002 DIPHENHYDRAMINE PER 50 MG: Performed by: INTERNAL MEDICINE

## 2024-04-18 PROCEDURE — 25010000002 CARBOPLATIN PER 50 MG: Performed by: INTERNAL MEDICINE

## 2024-04-18 PROCEDURE — 96375 TX/PRO/DX INJ NEW DRUG ADDON: CPT

## 2024-04-18 PROCEDURE — 25010000002 PACLITAXEL PER 1 MG: Performed by: INTERNAL MEDICINE

## 2024-04-18 PROCEDURE — 96368 THER/DIAG CONCURRENT INF: CPT

## 2024-04-18 RX ORDER — DIPHENHYDRAMINE HYDROCHLORIDE 50 MG/ML
50 INJECTION INTRAMUSCULAR; INTRAVENOUS AS NEEDED
Status: DISCONTINUED | OUTPATIENT
Start: 2024-04-18 | End: 2024-04-19 | Stop reason: HOSPADM

## 2024-04-18 RX ORDER — FAMOTIDINE 10 MG/ML
20 INJECTION, SOLUTION INTRAVENOUS ONCE
Status: COMPLETED | OUTPATIENT
Start: 2024-04-18 | End: 2024-04-18

## 2024-04-18 RX ORDER — FAMOTIDINE 10 MG/ML
20 INJECTION, SOLUTION INTRAVENOUS ONCE
Status: CANCELLED | OUTPATIENT
Start: 2024-04-18

## 2024-04-18 RX ORDER — FAMOTIDINE 10 MG/ML
20 INJECTION, SOLUTION INTRAVENOUS AS NEEDED
Status: CANCELLED | OUTPATIENT
Start: 2024-04-18

## 2024-04-18 RX ORDER — DIPHENHYDRAMINE HYDROCHLORIDE 50 MG/ML
50 INJECTION INTRAMUSCULAR; INTRAVENOUS AS NEEDED
Status: CANCELLED | OUTPATIENT
Start: 2024-04-18

## 2024-04-18 RX ORDER — SODIUM CHLORIDE 9 MG/ML
20 INJECTION, SOLUTION INTRAVENOUS ONCE
Status: COMPLETED | OUTPATIENT
Start: 2024-04-18 | End: 2024-04-18

## 2024-04-18 RX ORDER — PALONOSETRON 0.05 MG/ML
0.25 INJECTION, SOLUTION INTRAVENOUS ONCE
Status: COMPLETED | OUTPATIENT
Start: 2024-04-18 | End: 2024-04-18

## 2024-04-18 RX ORDER — SODIUM CHLORIDE 9 MG/ML
20 INJECTION, SOLUTION INTRAVENOUS ONCE
Status: CANCELLED | OUTPATIENT
Start: 2024-04-18

## 2024-04-18 RX ORDER — PALONOSETRON 0.05 MG/ML
0.25 INJECTION, SOLUTION INTRAVENOUS ONCE
Status: CANCELLED | OUTPATIENT
Start: 2024-04-18

## 2024-04-18 RX ORDER — FAMOTIDINE 10 MG/ML
20 INJECTION, SOLUTION INTRAVENOUS AS NEEDED
Status: DISCONTINUED | OUTPATIENT
Start: 2024-04-18 | End: 2024-04-19 | Stop reason: HOSPADM

## 2024-04-18 RX ADMIN — SODIUM CHLORIDE 500 MG: 9 INJECTION, SOLUTION INTRAVENOUS at 09:52

## 2024-04-18 RX ADMIN — CARBOPLATIN 570 MG: 10 INJECTION, SOLUTION INTRAVENOUS at 15:00

## 2024-04-18 RX ADMIN — DEXAMETHASONE SODIUM PHOSPHATE 20 MG: 10 INJECTION, SOLUTION INTRAMUSCULAR; INTRAVENOUS at 10:43

## 2024-04-18 RX ADMIN — DIPHENHYDRAMINE HYDROCHLORIDE 50 MG: 50 INJECTION, SOLUTION INTRAMUSCULAR; INTRAVENOUS at 11:18

## 2024-04-18 RX ADMIN — FOSAPREPITANT DIMEGLUMINE 150 MG: 150 INJECTION, POWDER, LYOPHILIZED, FOR SOLUTION INTRAVENOUS at 10:43

## 2024-04-18 RX ADMIN — FAMOTIDINE 20 MG: 10 INJECTION, SOLUTION INTRAVENOUS at 11:18

## 2024-04-18 RX ADMIN — SODIUM CHLORIDE 335 MG: 9 INJECTION, SOLUTION INTRAVENOUS at 11:45

## 2024-04-18 RX ADMIN — PALONOSETRON HYDROCHLORIDE 0.25 MG: 0.25 INJECTION INTRAVENOUS at 10:37

## 2024-04-18 RX ADMIN — SODIUM CHLORIDE 20 ML/HR: 9 INJECTION, SOLUTION INTRAVENOUS at 09:51

## 2024-04-18 NOTE — TELEPHONE ENCOUNTER
----- Message from Tanya Salgado MD sent at 4/18/2024 12:49 PM EDT -----  Notify patient that CT scan of chest did not show metastatic disease please, thank you!  ----- Message -----  From: O[place]e, Rad Results [place] In  Sent: 4/18/2024   9:11 AM EDT  To: Tanya Salgado MD

## 2024-04-18 NOTE — PROGRESS NOTES
"Outpatient Oncology Nutrition     Reason for Visit: Oncology Nutrition Screening and Patient Education / Met with patient and her niece during her initial chemotherapy infusion appointment.     Patient Name:  Fallon Pierson    :  1945    MRN:  [id number]    Date of Encounter: 2024    Nutrition Assessment     Diagnosis: Grade 3 endometrioid adenocarcinoma with extensive cervical involvement, HPV negative Pap smear     Neoadjuvant Chemotherapy: OP ENDOMETRIAL Dostarlimab-gxly / CARBOplatin AUC=5 / PACLitaxel - every 21 days x 3 cycles     Treatment Plan: carboplatin, paclitaxel, and doxtarlimab x 3, then interval debulking, and likely adjuvant chemo/RT after surgery    Patient Active Problem List:    Patient Active Problem List   Diagnosis    Endometrial cancer       Food / Nutrition Related History   Patient reports she generally follows a vegan diet but will occasionally include animal products in her diet such as beef and eggs and has recently purchased yogurt.  She reports her appetite and oral intake have been normal.  She also reports she does not weigh herself at home but has not noticed any significant weight changes recently.     Hydration Status   Discussed the importance of hydration and recommended she continue with her current intake of water.     Goal: ~90 ounces     How many 8 ounces glasses of water do you consume per day? ~11     Enteral Feeding       Anthropometric Measurements     Height:    Ht Readings from Last 1 Encounters:   24 160 cm (62.99\")       Weight:    Wt Readings from Last 1 Encounters:   24 84.1 kg (185 lb 4.8 oz)       BMI: 32.8 - Obese    Weight Change: ~5-7# (3.2%) weight loss x ~3 weeks per chart review     Review of Lab Data (Time Frame - 1 month / 2 month)   Labs reviewed - 24    Medication Review   MAR reviewed     Nutrition Focused Physical Findings       Nutrition Impact Symptoms   No problems with eating at this time     Physical Activity   Normal " with no limitations    Current Nutritional Intake     Oral diet:  Vegan     Oral nutritional supplements: Modular protein powder - has purchased but has not tried it yet    Intake: oral intake has been normal     Malnutrition Risk Assessment     Recent weight loss over the past 6 months:  Yes    How much weight loss:  1 = 2-13 lbs    Eating poorly because of a decreased appetite:  0 = No    Malnutrition Screening Score:     MST = 0 or 1 Patient not at risk for malnutrition    Nutrition Diagnosis     Problem    Etiology    Signs / Symptoms      Nutrition Intervention   Discussed the importance of good nutrition during her treatment course focusing on adequate calorie, protein, nutrient and fluid intake.  Advised her to be consuming smaller more frequent meals/snacks throughout the day to aid with potential nausea and / or diarrhea symptom management.  Emphasized the importance of protein and its role in the diet; reviewed high protein foods - both animal and plant based; and recommended she have a protein source at each meal/snack.  Discussed homemade protein shakes / smoothies with added modular protein powder and their role in the diet.  Encouraged her to drink homemade protein shakes / smoothies as needed to aid with calorie / protein intake.  Also discussed tips for potential taste changes including avoiding metal utensils / drinkware and to use the baking soda / salt water mouth rinse before eating.      Goal   To achieve adequate nutritional and hydration intake to aid with weight maintenance.  To aid with nutrition impact symptom management as needed.     Monitoring / Evaluation   Answered their questions and both voiced understanding of information discussed.  RD's contact information provided and encouraged to call with questions.  Will follow up as indicated.     Vera Oswald MS, RD, LD

## 2024-04-22 ENCOUNTER — TELEPHONE (OUTPATIENT)
Dept: GYNECOLOGIC ONCOLOGY | Facility: CLINIC | Age: 79
End: 2024-04-22
Payer: MEDICARE

## 2024-04-22 NOTE — TELEPHONE ENCOUNTER
RN reported no meta static disease in the chest per CT scan read, according to Dr. Salgado. Patient verbalized understanding.   ----- Message from Tanya Salgado MD sent at 4/18/2024 12:49 PM EDT -----  Notify patient that CT scan of chest did not show metastatic disease please, thank you!  ----- Message -----  From: Interface, Rad Results [place] In  Sent: 4/18/2024   9:11 AM EDT  To: Tanya Salgado MD

## 2025-01-01 NOTE — ACP (ADVANCE CARE PLANNING)
Advance Care Planning   ACP discussion was held with the patient during this visit. Patient does not have an advance directive, declines further assistance.        SYLVIA aiken